# Patient Record
Sex: FEMALE | Employment: UNEMPLOYED | URBAN - METROPOLITAN AREA
[De-identification: names, ages, dates, MRNs, and addresses within clinical notes are randomized per-mention and may not be internally consistent; named-entity substitution may affect disease eponyms.]

---

## 2017-08-28 ENCOUNTER — TRANSFERRED RECORDS (OUTPATIENT)
Dept: HEALTH INFORMATION MANAGEMENT | Facility: CLINIC | Age: 27
End: 2017-08-28

## 2018-05-16 ENCOUNTER — ALLIED HEALTH/NURSE VISIT (OUTPATIENT)
Dept: NURSING | Facility: CLINIC | Age: 28
End: 2018-05-16
Payer: MEDICAID

## 2018-05-16 VITALS
RESPIRATION RATE: 16 BRPM | HEART RATE: 67 BPM | SYSTOLIC BLOOD PRESSURE: 114 MMHG | DIASTOLIC BLOOD PRESSURE: 79 MMHG | OXYGEN SATURATION: 99 % | WEIGHT: 147.9 LBS

## 2018-05-16 DIAGNOSIS — D50.9 ANEMIA, IRON DEFICIENCY: Primary | ICD-10-CM

## 2018-05-16 DIAGNOSIS — Z33.1 PREGNANCY, INCIDENTAL: ICD-10-CM

## 2018-05-16 LAB
ABO + RH BLD: NORMAL
ABO + RH BLD: NORMAL
BETA HCG QUAL IFA URINE: POSITIVE
BLD GP AB SCN SERPL QL: NORMAL
BLOOD BANK CMNT PATIENT-IMP: NORMAL
ERYTHROCYTE [DISTWIDTH] IN BLOOD BY AUTOMATED COUNT: 15.2 % (ref 10–15)
HCT VFR BLD AUTO: 35.5 % (ref 35–47)
HGB BLD-MCNC: 12.1 G/DL (ref 11.7–15.7)
MCH RBC QN AUTO: 29.9 PG (ref 26.5–33)
MCHC RBC AUTO-ENTMCNC: 34.1 G/DL (ref 31.5–36.5)
MCV RBC AUTO: 88 FL (ref 78–100)
PLATELET # BLD AUTO: 305 10E9/L (ref 150–450)
RBC # BLD AUTO: 4.05 10E12/L (ref 3.8–5.2)
SPECIMEN EXP DATE BLD: NORMAL
WBC # BLD AUTO: 6.2 10E9/L (ref 4–11)

## 2018-05-16 PROCEDURE — 86762 RUBELLA ANTIBODY: CPT | Performed by: FAMILY MEDICINE

## 2018-05-16 PROCEDURE — 87389 HIV-1 AG W/HIV-1&-2 AB AG IA: CPT | Performed by: FAMILY MEDICINE

## 2018-05-16 PROCEDURE — G0499 HEPB SCREEN HIGH RISK INDIV: HCPCS | Performed by: FAMILY MEDICINE

## 2018-05-16 PROCEDURE — 82746 ASSAY OF FOLIC ACID SERUM: CPT | Performed by: FAMILY MEDICINE

## 2018-05-16 PROCEDURE — 87591 N.GONORRHOEAE DNA AMP PROB: CPT | Performed by: FAMILY MEDICINE

## 2018-05-16 PROCEDURE — 83550 IRON BINDING TEST: CPT | Performed by: FAMILY MEDICINE

## 2018-05-16 PROCEDURE — 83540 ASSAY OF IRON: CPT | Performed by: FAMILY MEDICINE

## 2018-05-16 PROCEDURE — 36415 COLL VENOUS BLD VENIPUNCTURE: CPT | Performed by: FAMILY MEDICINE

## 2018-05-16 PROCEDURE — 99207 ZZC NO CHARGE NURSE ONLY: CPT

## 2018-05-16 PROCEDURE — 85027 COMPLETE CBC AUTOMATED: CPT | Performed by: FAMILY MEDICINE

## 2018-05-16 PROCEDURE — 86900 BLOOD TYPING SEROLOGIC ABO: CPT | Performed by: FAMILY MEDICINE

## 2018-05-16 PROCEDURE — 86901 BLOOD TYPING SEROLOGIC RH(D): CPT | Performed by: FAMILY MEDICINE

## 2018-05-16 PROCEDURE — 86850 RBC ANTIBODY SCREEN: CPT | Performed by: FAMILY MEDICINE

## 2018-05-16 PROCEDURE — 87491 CHLMYD TRACH DNA AMP PROBE: CPT | Performed by: FAMILY MEDICINE

## 2018-05-16 PROCEDURE — 87086 URINE CULTURE/COLONY COUNT: CPT | Performed by: FAMILY MEDICINE

## 2018-05-16 PROCEDURE — 84703 CHORIONIC GONADOTROPIN ASSAY: CPT | Performed by: FAMILY MEDICINE

## 2018-05-16 RX ORDER — PRENATAL VIT/IRON FUM/FOLIC AC 27MG-0.8MG
1 TABLET ORAL DAILY
Qty: 100 TABLET | Refills: 3 | COMMUNITY
Start: 2018-05-16

## 2018-05-16 NOTE — MR AVS SNAPSHOT
After Visit Summary   5/16/2018    Arlin Rodgers    MRN: [id number]           Patient Information     Date Of Birth          1990        Visit Information        Provider Department      5/16/2018 8:00 AM CR OB CLINIC [facility]        Today's Diagnoses     Anemia, iron deficiency    -  1    Pregnancy, incidental           Follow-ups after your visit        Your next 10 appointments already scheduled     May 18, 2018  2:40 PM CDT   US OB < 14 WEEKS SINGLE with [id number]   [facility] [place] ([facility])    [address] [address] [address] [phone]           Please bring a list of your medicines (including vitamins, minerals and over-the-counter drugs). Also, tell your doctor about any allergies you may have. Wear comfortable clothes and leave your valuables at home.  If you re less than 20 weeks drink four 8-ounce glasses of fluid an hour before your exam. If you need to empty your bladder before your exam, try to release only a little urine. Then, drink another glass of fluid.  You may have up to two family members in the exam room. If you bring a small child, an adult must be there to care for him or her.  Please call the Imaging Department at your exam site with any questions.            Jun 11, 2018  3:00 PM CDT   New Prenatal with Medina Quach MD   [facility] ([facility])    [id number] [place] [address] [phone]              Future tests that were ordered for you today     Open Future Orders        Priority Expected Expires Ordered    US OB < 14 Weeks Single Routine  5/16/2019 5/16/2018            Who to contact     If you have questions or need follow up information about today's clinic visit or your schedule please contact [facility] directly at [phone].  Normal or non-critical lab and imaging results will be  "communicated to you by [employer], letter or phone within 4 business days after the clinic has received the results. If you do not hear from us within 7 days, please contact the clinic through [employer] or phone. If you have a critical or abnormal lab result, we will notify you by phone as soon as possible.  Submit refill requests through [employer] or call your pharmacy and they will forward the refill request to us. Please allow 3 business days for your refill to be completed.          Additional Information About Your Visit        [employer] Information     [employer] lets you send messages to your doctor, view your test results, renew your prescriptions, schedule appointments and more. To sign up, go to [email or web address] . Click on \"Log in\" on the left side of the screen, which will take you to the Welcome page. Then click on \"Sign up Now\" on the right side of the page.     You will be asked to enter the access code listed below, as well as some personal information. Please follow the directions to create your username and password.     Your access code is: [email or web address]  Expires: 2018  9:18 AM     Your access code will  in 90 days. If you need help or a new code, please call your [place] clinic or [phone].        Care EveryWhere ID     This is your Care EveryWhere ID. This could be used by other organizations to access your [place] medical records  [id number]        Your Vitals Were     Pulse Respirations Last Period Pulse Oximetry          67 16 2018 (Approximate) 99%         Blood Pressure from Last 3 Encounters:   18 114/79    Weight from Last 3 Encounters:   18 147 lb 14.4 oz (67.1 kg)              We Performed the Following     **Iron and iron binding capacity FUTURE 2mo     ABO/Rh type and screen     Beta HCG qual IFA urine     CBC with platelets     Chlamydia trachomatis PCR     Folate     Hepatitis B surface antigen     HIV Antigen Antibody Combo     Neisseria gonorrhoeae " PCR     Rubella Antibody IgG Quantitative     Urine Culture Aerobic Bacterial        Primary Care Provider Fax #    Physician No Ref-Primary [phone]       No address on file        Equal Access to Services     HONEY VARGAS : Hadii aad ku hadmeitoma Wynn, yaritza cherylwagner, angela castro, rick ericksonadrian carvajal. So [facility] [phone].    ATENCIÓN: Si habla español, tiene a hughes disposición servicios gratuitos de asistencia lingüística. Llame al [phone].    We comply with applicable federal civil rights laws and [place] laws. We do not discriminate on the basis of race, color, national origin, age, disability, sex, sexual orientation, or gender identity.            Thank you!     Thank you for choosing [facility]  for your care. Our goal is always to provide you with excellent care. Hearing back from our patients is one way we can continue to improve our services. Please take a few minutes to complete the written survey that you may receive in the mail after your visit with us. Thank you!             Your Updated Medication List - Protect others around you: Learn how to safely use, store and throw away your medicines at www.disposemymeds.org.          This list is accurate as of 5/16/18  9:18 AM.  Always use your most recent med list.                   Brand Name Dispense Instructions for use Diagnosis    prenatal multivitamin plus iron 27-0.8 MG Tabs per tablet     100 tablet    Take 1 tablet by mouth daily    Anemia, iron deficiency, Pregnancy, incidental

## 2018-05-16 NOTE — NURSING NOTE
Subjective: 27 year old patient presents for pregnancy confirmation. Her last menstrual period was 3/19/18. She has had a positive home test.  This is her 4th pregnancy, G4, P2. She has had previous vaginal deliveries. She would like to be seen Dr. Downing for her prenatal care. She has started prenatal vitamins.      Exam:  Appointment not scheduled correctly as 611 so can not get to due date section and education section.    General Appearance: not feeling well due to nausea and pale-requested iron test as has iron deficiency anemia and needed double doses during previous pregnancies.  Discussed with Dr. Downing and also wanted Folate.  Added Folate.    Her urine pregnancy test is positive.    Assessment: positive pregnancy confirmation.    Plan: Patient has an EDC of 12/25/18. Is currently 8 weeks along. I scheduled her first OB appointment with Dr. Downing 6/11/18 3 pm. Risk assessment done. We discussed basic pregnancy care, diet, exercise and prenatal vitamins.     Pre Term Labor Risk Assessment   1. Is the patient's age <18 or >40? no  2. Does the patient have a BMI < 18.5? no  3. If previous pregnancy, was delivery within previous 6 months? no  4. Have you ever been diagnosed with pyelonephritis? no  5. Have you ever delivered a baby prior to 37 weeks gestation? no  6. Have you ever been told you have a uterine anomaly? no  7. Do you currently have uterine fibroids? no  8. Have you had any gynecological surgical procedures such as cervical conization, a LEEP procedure, laser treatment or cryosurgery of the cervix? no  9. Did your mother take NATHALIE or any other hormones when she was pregnant with you? no  10. Did conception for this pregnancy occur via In Vitro Fertilization? no  11. Are you carrying twins? no  12. Do you currently use tobacco products? no  13. Prior to this pregnancy, how much alcohol did you drink each week? (if >7/week= high risk..)  none  14. Since you learned you were pregnant, how much beer,  wine or hard liquor did you drink per week? (anything >0 = high risk) none  15. Prior to learning you are pregnant, did you use any of the following: marijuana, prescription narcotics, speed, cocaine, heroin, hallucinogens or other drugs? (any use within 1 yr = high risk)  none  16. Since you learned you are pregnant, have you used any of the following: marijuana, prescription narcotics, speed, cocaine, heroin, hallucinogens or other drugs? (any use = high risk)  none  17. Do you have a history of chemical dependency (yes=high risk)  no  18. Have you ever been treated for more than 1 Urinary Tract Infection during this pregnancy? no  19. Do you have a history of Depression, Bi-polar disorder, anxiety, schizophrenia or other mental illness?  no  20. Are you currently being treated for depression, bi-polar disorder, anxiety, schizophrenia or other mental illness? no  21. Have you had Chlamydia or gonorrhea during this pregnancy? no  22. Periodontal disease (gum disease)? no  23. Has anyone hit, slapped, kicked or otherwise hurt you? no  24. Has anyone forced you to have sex when you didn't want to? no  Summary:   Patient is not high risk for  Labor   Karon Jimenez RN

## 2018-05-17 LAB
BACTERIA SPEC CULT: NORMAL
C TRACH DNA SPEC QL NAA+PROBE: NEGATIVE
FOLATE SERPL-MCNC: 37.5 NG/ML
HBV SURFACE AG SERPL QL IA: NONREACTIVE
HIV 1+2 AB+HIV1 P24 AG SERPL QL IA: NONREACTIVE
IRON SATN MFR SERPL: 18 % (ref 15–46)
IRON SERPL-MCNC: 65 UG/DL (ref 35–180)
N GONORRHOEA DNA SPEC QL NAA+PROBE: NEGATIVE
RUBV IGG SERPL IA-ACNC: 26 IU/ML
SPECIMEN SOURCE: NORMAL
TIBC SERPL-MCNC: 370 UG/DL (ref 240–430)

## 2018-05-18 ENCOUNTER — HOSPITAL ENCOUNTER (OUTPATIENT)
Dept: ULTRASOUND IMAGING | Facility: CLINIC | Age: 28
Discharge: HOME | End: 2018-05-18
Attending: FAMILY MEDICINE | Admitting: FAMILY MEDICINE
Payer: MEDICAID

## 2018-05-18 DIAGNOSIS — Z33.1 PREGNANCY, INCIDENTAL: ICD-10-CM

## 2018-05-18 PROCEDURE — 76801 OB US < 14 WKS SINGLE FETUS: CPT

## 2018-06-11 ENCOUNTER — TELEPHONE (OUTPATIENT)
Dept: FAMILY MEDICINE | Facility: CLINIC | Age: 28
End: 2018-06-11

## 2018-07-02 ENCOUNTER — TRANSFERRED RECORDS (OUTPATIENT)
Dept: HEALTH INFORMATION MANAGEMENT | Facility: CLINIC | Age: 28
End: 2018-07-02

## 2018-07-02 LAB
C TRACH DNA SPEC QL PROBE+SIG AMP: NORMAL
N GONORRHOEA DNA SPEC QL PROBE+SIG AMP: NORMAL
SPECIMEN DESCRIP: NORMAL
SPECIMEN DESCRIPTION: NORMAL

## 2018-07-25 ENCOUNTER — PRENATAL OFFICE VISIT (OUTPATIENT)
Dept: FAMILY MEDICINE | Facility: CLINIC | Age: 28
End: 2018-07-25
Payer: COMMERCIAL

## 2018-07-25 VITALS
WEIGHT: 140 LBS | TEMPERATURE: 98 F | BODY MASS INDEX: 21.22 KG/M2 | DIASTOLIC BLOOD PRESSURE: 58 MMHG | HEIGHT: 68 IN | HEART RATE: 64 BPM | SYSTOLIC BLOOD PRESSURE: 96 MMHG | RESPIRATION RATE: 16 BRPM

## 2018-07-25 DIAGNOSIS — Z12.4 SCREENING FOR MALIGNANT NEOPLASM OF CERVIX: ICD-10-CM

## 2018-07-25 DIAGNOSIS — Z34.92 PRENATAL CARE IN SECOND TRIMESTER: Primary | ICD-10-CM

## 2018-07-25 DIAGNOSIS — R53.83 FATIGUE, UNSPECIFIED TYPE: ICD-10-CM

## 2018-07-25 LAB — HGB BLD-MCNC: 11.8 G/DL (ref 11.7–15.7)

## 2018-07-25 PROCEDURE — 99207 ZZC PRENATAL VISIT: CPT | Performed by: FAMILY MEDICINE

## 2018-07-25 PROCEDURE — 81511 FTL CGEN ABNOR FOUR ANAL: CPT | Mod: 90 | Performed by: FAMILY MEDICINE

## 2018-07-25 PROCEDURE — 00000218 ZZHCL STATISTIC OBHBG - HEMOGLOBIN: Performed by: FAMILY MEDICINE

## 2018-07-25 PROCEDURE — 36415 COLL VENOUS BLD VENIPUNCTURE: CPT | Performed by: FAMILY MEDICINE

## 2018-07-25 PROCEDURE — 84443 ASSAY THYROID STIM HORMONE: CPT | Performed by: FAMILY MEDICINE

## 2018-07-25 PROCEDURE — 99000 SPECIMEN HANDLING OFFICE-LAB: CPT | Performed by: FAMILY MEDICINE

## 2018-07-25 NOTE — PROGRESS NOTES
"Subjective:  Arlin Rodgers is a 28 year old female  who presents today for her first prenatal visit.  She has never had an abnormal PAP smear.  Her LMP was 3/23/18.  She has had trouble with nausea.  This is her 3rd pregnancy.  She is a G 3 P .    Her EDC is 18.  She has the following questions:  None.   She has been feeling the baby move for a few days.       Obstetric History       T2      L2     SAB0   TAB0   Ectopic0   Multiple0   Live Births2       # Outcome Date GA Lbr Stone/2nd Weight Sex Delivery Anes PTL Lv   3 Current            2 Term 11/27/15 39w0d  8 lb 4 oz (3.742 kg) F  EPI N BABS      Name: Pedro Salazar Term 13 39w5d  7 lb 9 oz (3.43 kg) M  EPI N BABS      Name: Major            Current Outpatient Prescriptions   Medication Sig Dispense Refill     Prenatal Vit-Fe Fumarate-FA (PRENATAL MULTIVITAMIN PLUS IRON) 27-0.8 MG TABS per tablet Take 1 tablet by mouth daily 100 tablet 3       Past Medical History:   Diagnosis Date     Blood type A+        History reviewed. No pertinent surgical history.    History reviewed. No pertinent family history.    Social History   Substance Use Topics     Smoking status: Former Smoker     Smokeless tobacco: Never Used     Alcohol use No       Objective:  Blood pressure 96/58, pulse 64, temperature 98  F (36.7  C), temperature source Oral, resp. rate 16, height 5' 8\" (1.727 m), weight 140 lb (63.5 kg), last menstrual period 2018, not currently breastfeeding.  General appearance: Healthy.  Mental Status: cooperative, normal affect, no gross thought process defects.  HEENT:   Ears- Normal external canals and TMs  Eyes- PERRL, EOM's intact, fundi benign, sharp disc margins.  Throat- Normal  Nodes- Negative  Thyroid: Normal to palpation, no enlargement or nodules noted.  Breasts: Symmetric without mass tenderness or discharge.  Axillary nodes negative.  Lungs: Clear to auscultation bilaterally  Heart.: Normal rate and rhythm.  No " murmurs, clicks or gallops.  Pulses:    R-4/4, PT-4/4, DP-4/4  Abdomen: BS active. Soft, non-tender, no masses or organomegaly.  Aorta normal. No bruits.  Genitalia: Normal female external genitalia without lesions.  Speculum:  Cervix normal,  Pap taken, bimanual exam  16 week size uterus, no adenexal mass or tenderness.  Doptone 140-150.  Extremities: Normal without edema  Reflexes:  Symmetric bilaterally and 2 + at the patella  Skin: Clear and free of rash.    Assessment:  1. Arlin Rodgers is a healthy 28 year old female here for a first prenatal visit.  2.  Complains of fatigue and has hx of anemia in previous pregnancies    Plan:  1. A Pap smear was NOT obtained  2. Prenatal labs ordered - see epicare orders  3.  fetal anatomy survey to be done around 20 weeks gestation   4. The QUAD test was discussed and offered to the patient and she accepted  5.  Will check OBHGB and TSH   6. Follow up in 4 weeks and as needed

## 2018-07-25 NOTE — MR AVS SNAPSHOT
"              After Visit Summary   7/25/2018    Arlin Rodgers    MRN: [id number]           Patient Information     Date Of Birth          1990        Visit Information        Provider Department      7/25/2018 11:00 AM Medina Quach MD [facility]        Today's Diagnoses     Prenatal care in second trimester    -  1    Screening for malignant neoplasm of cervix        Fatigue, unspecified type           Follow-ups after your visit        Follow-up notes from your care team     Return in about 4 weeks (around 8/22/2018) for prenatal care.      Future tests that were ordered for you today     Open Future Orders        Priority Expected Expires Ordered    US OB > 14 Weeks Complete Single Routine  7/25/2019 7/25/2018            Who to contact     If you have questions or need follow up information about today's clinic visit or your schedule please contact [facility] directly at [phone].  Normal or non-critical lab and imaging results will be communicated to you by MyChart, letter or phone within 4 business days after the clinic has received the results. If you do not hear from us within 7 days, please contact the clinic through MyChart or phone. If you have a critical or abnormal lab result, we will notify you by phone as soon as possible.  Submit refill requests through [employer] or call your pharmacy and they will forward the refill request to us. Please allow 3 business days for your refill to be completed.          Additional Information About Your Visit        Care EveryWhere ID     This is your Care EveryWhere ID. This could be used by other organizations to access your [place] medical records  [id number]        Your Vitals Were     Pulse Temperature Respirations Height Last Period Breastfeeding?    64 98  F (36.7  C) (Oral) 16 5' 8\" (1.727 m) 03/23/2018 No    BMI (Body Mass Index)                   21.29 kg/m2            Blood Pressure from Last 3 Encounters: "   07/25/18 96/58   05/16/18 114/79    Weight from Last 3 Encounters:   07/25/18 140 lb (63.5 kg)   05/16/18 147 lb 14.4 oz (67.1 kg)              We Performed the Following     Maternal Quad Marker 2nd Trimester     OB hemoglobin     TSH with free T4 reflex        Primary Care Provider Office Phone # Fax #    [facility] [phone] [address]  [facility] [address]        Equal Access to Services     HONEY VARGAS : Hadii aad ku hadasho Soomaali, waaxda luqadaha, qaybta kaalmada adeegyada, waxay idiin hayaan adeeg tish rosario . So [facility] [place] [phone].    ATENCIÓN: Si habla español, tiene a hughes disposición servicios gratuitos de asistencia lingüística. Llame al [phone].    We comply with applicable federal civil rights laws and [place] laws. We do not discriminate on the basis of race, color, national origin, age, disability, sex, sexual orientation, or gender identity.            Thank you!     Thank you for choosing [facility]  for your care. Our goal is always to provide you with excellent care. Hearing back from our patients is one way we can continue to improve our services. Please take a few minutes to complete the written survey that you may receive in the mail after your visit with us. Thank you!             Your Updated Medication List - Protect others around you: Learn how to safely use, store and throw away your medicines at www.disposemymeds.org.          This list is accurate as of 7/25/18 11:34 AM.  Always use your most recent med list.                   Brand Name Dispense Instructions for use Diagnosis    prenatal multivitamin plus iron 27-0.8 MG Tabs per tablet     100 tablet    Take 1 tablet by mouth daily    Anemia, iron deficiency, Pregnancy, incidental

## 2018-07-25 NOTE — LETTER
July 27, 2018      Arlin Parekhnmrobin  [address]   [facility] [address]        Dear ,    We are writing to inform you of your test results.    This quad screen in normal/Neg     Resulted Orders   Maternal Quad Marker 2nd Trimester   Result Value Ref Range    Pt Date of Birth 1990     Patient Weight 140     Weight Units LBS     Due Date 12/28/2018     Dating Method LMP     Last Mens Period 03/23/2018     Number of Fetuses VALDIVIA     Monochorionic Twins No     Race of Mother      Diabetic at Conception No     Smoking Status No     Valproic/Carbamazepine Status No     Previous Trisomy Pregnancy No     Fam History Of NTD No     In Vitro Fertilization Egg Donor Age No     Repeat Specimen No     Alpha Feto Protein 42 ng/mL    MoM for AFP 0.97     Estriol 1.58 ng/mL    MoM uE3 1.13     hCG 2nd Trimester 14245 IU/L    MoM hCG 2nd Trimester 1.21     Dimeric Inhibin A 180 pg/mL    MoM Dimeric Inhibin A 1.11     AFP Interpretation Screen Neg       Comment:      (Note)  INTERPRETATION: SCREEN NEGATIVE                               Neural Tube Defects (NTD)   Negative       Down syndrome (DS)          Negative       Trisomy 18 (T18)            Negative                                                               Pre-Test     Post-Test      Cutoff                                      Neural Tube Defects Risks   1:1030       < 1:56617    1:250           Down Syndrome Risks         1:990        1:7300       1:150           Trisomy 18 Risks            1:3860       < 1:56006    1:100                                        Comments:                                                  The risk of an open neural tube defect is less than the  screening cut-off.                                         The risk of Down syndrome is less than the screening  cut-off.                                                   The risk of trisomy 18 is less than the screening cut-off.  Test developed and  characteristics determined by [facility]. See Compliance S  tatement B: [email or web address]/CS      Maternal [age] Delivery 26.5 yr    Patient Weight 140.0 lbs.     Estimated Due Date SEE NOTE       Comment:      (Note)  Results for Estimated Due Date: 12-28-18       Gestational Age Calculated 17 wks, 5 days     Dating Other     Num of Fetuses Cisneros     Maternal Race Nonblack     Insulin Diabetic No     Smoking Status No     Fam History of NTD No     Family History of Aneuploidy No     Specimen Iteration SEE NOTE       Comment:      (Note)  Initial sample      Electronic Enhanced Report SEE NOTE       Comment:      (Note)  Access eblizz Enhanced Report using either link below:  -Direct access:   [email or web address]  -Enter Username, Password: [email or web address]  Username: [email or web address]  Password: [email or web address]  Performed by [facility],  [address] [place] [address] [phone]  [email or web address], Anirudh Anderson MD, Lab. Director     OB hemoglobin   Result Value Ref Range    Hemoglobin 11.8 11.7 - 15.7 g/dL   TSH with free T4 reflex   Result Value Ref Range    TSH 0.66 0.40 - 4.00 mU/L       If you have any questions or concerns, please call the clinic at the number listed above.       Sincerely,        Medina Quach MD/MARCELO

## 2018-07-26 LAB — TSH SERPL DL<=0.005 MIU/L-ACNC: 0.66 MU/L (ref 0.4–4)

## 2018-07-27 LAB
# FETUSES US: NORMAL
# FETUSES: NORMAL
AFP ADJ MOM AMN: 0.97
AFP SERPL-MCNC: 42 NG/ML
AGE - REPORTED: 26.5 YR
CURRENT SMOKER: NO
CURRENT SMOKER: NO
DIABETES STATUS PATIENT: NO
FAMILY MEMBER DISEASES HX: NO
FAMILY MEMBER DISEASES HX: NO
GA METHOD: NORMAL
GA METHOD: NORMAL
GA: NORMAL WK
HCG MOM SERPL: 1.21
HCG SERPL-ACNC: NORMAL IU/L
HX OF HEREDITARY DISORDERS: NO
IDDM PATIENT QL: NO
INHIBIN A MOM SERPL: 1.11
INHIBIN A SERPL-MCNC: 180 PG/ML
INTEGRATED SCN PATIENT-IMP: NORMAL
IVF PREGNANCY: NO
LMP START DATE: NORMAL
MONOCHORIONIC TWINS: NO
PATHOLOGY STUDY: NORMAL
PREV FETUS DEFECT: NO
SERVICE CMNT-IMP: NO
SPECIMEN DRAWN SERPL: NORMAL
U ESTRIOL MOM SERPL: 1.13
U ESTRIOL SERPL-MCNC: 1.58 NG/ML
VALPROIC/CARBAMAZEPINE STATUS: NO
WEIGHT UNITS: NORMAL

## 2018-08-20 ENCOUNTER — HOSPITAL ENCOUNTER (OUTPATIENT)
Dept: ULTRASOUND IMAGING | Facility: CLINIC | Age: 28
Discharge: HOME | End: 2018-08-20
Attending: FAMILY MEDICINE | Admitting: FAMILY MEDICINE
Payer: COMMERCIAL

## 2018-08-20 DIAGNOSIS — Z34.92 PRENATAL CARE IN SECOND TRIMESTER: ICD-10-CM

## 2018-08-20 PROCEDURE — 76805 OB US >/= 14 WKS SNGL FETUS: CPT

## 2018-09-12 ENCOUNTER — PRENATAL OFFICE VISIT (OUTPATIENT)
Dept: FAMILY MEDICINE | Facility: CLINIC | Age: 28
End: 2018-09-12
Payer: COMMERCIAL

## 2018-09-12 VITALS
SYSTOLIC BLOOD PRESSURE: 108 MMHG | DIASTOLIC BLOOD PRESSURE: 64 MMHG | RESPIRATION RATE: 16 BRPM | WEIGHT: 148.8 LBS | OXYGEN SATURATION: 98 % | BODY MASS INDEX: 22.55 KG/M2 | HEART RATE: 71 BPM | HEIGHT: 68 IN | TEMPERATURE: 98.1 F

## 2018-09-12 DIAGNOSIS — Z34.92 PRENATAL CARE IN SECOND TRIMESTER: Primary | ICD-10-CM

## 2018-09-12 PROCEDURE — 99207 ZZC PRENATAL VISIT: CPT | Performed by: FAMILY MEDICINE

## 2018-09-12 NOTE — MR AVS SNAPSHOT
"              After Visit Summary   9/12/2018    Arlin Rodgers    MRN: [id number]           Patient Information     Date Of Birth          1990        Visit Information        Provider Department      9/12/2018 9:00 AM Medina Quach MD [facility]         Follow-ups after your visit        Follow-up notes from your care team     Return in about 4 weeks (around 10/10/2018) for prenatal care.      Who to contact     If you have questions or need follow up information about today's clinic visit or your schedule please contact [facility] directly at [phone].  Normal or non-critical lab and imaging results will be communicated to you by MyChart, letter or phone within 4 business days after the clinic has received the results. If you do not hear from us within 7 days, please contact the clinic through MyChart or phone. If you have a critical or abnormal lab result, we will notify you by phone as soon as possible.  Submit refill requests through [employer] or call your pharmacy and they will forward the refill request to us. Please allow 3 business days for your refill to be completed.          Additional Information About Your Visit        Care EveryWhere ID     This is your Care EveryWhere ID. This could be used by other organizations to access your [place] medical records  [id number]        Your Vitals Were     Pulse Temperature Respirations Height Last Period Pulse Oximetry    71 98.1  F (36.7  C) (Oral) 16 5' 8\" (1.727 m) 03/23/2018 98%    Breastfeeding? BMI (Body Mass Index)                No 22.62 kg/m2           Blood Pressure from Last 3 Encounters:   09/12/18 108/64   07/25/18 96/58   05/16/18 114/79    Weight from Last 3 Encounters:   09/12/18 148 lb 12.8 oz (67.5 kg)   07/25/18 140 lb (63.5 kg)   05/16/18 147 lb 14.4 oz (67.1 kg)              Today, you had the following     No orders found for display       Primary Care Provider Office Phone # Fax #    " [facility] [phone]       [address]  [facility] [address]        Equal Access to Services     HONEY VARGAS : Hadii aad ku hadmeitoma [place], wadorinada luqadaha, nelidata kaakuada matthew, rick winston laRaeremington carvajal. So [facility] [phone].    ATENCIÓN: Si habla español, tiene a hughes disposición servicios gratuitos de asistencia lingüística. Llame al [phone].    We comply with applicable federal civil rights laws and [place] laws. We do not discriminate on the basis of race, color, national origin, age, disability, sex, sexual orientation, or gender identity.            Thank you!     Thank you for choosing [facility]  for your care. Our goal is always to provide you with excellent care. Hearing back from our patients is one way we can continue to improve our services. Please take a few minutes to complete the written survey that you may receive in the mail after your visit with us. Thank you!             Your Updated Medication List - Protect others around you: Learn how to safely use, store and throw away your medicines at www.disposemymeds.org.          This list is accurate as of 9/12/18  9:28 AM.  Always use your most recent med list.                   Brand Name Dispense Instructions for use Diagnosis    prenatal multivitamin plus iron 27-0.8 MG Tabs per tablet     100 tablet    Take 1 tablet by mouth daily    Anemia, iron deficiency, Pregnancy, incidental

## 2018-09-12 NOTE — PROGRESS NOTES
Concerns: GIRL BABY  Increased pelvic pressure with bending and walking  No vaginal bleeding, no contractions, no leakage of fluid  No nausea/vomiting. No heartburn  No vaginal discharge. No dysuria.   No headache, vision changes, lower extremity swelling, upper abdominal pain, chest pain, shortness of breath  Tdap planned next visit  Discussed PTL, PROM, and when to call or come in.  Normal anatomy ultrasound.  RTC 4 weeks.  GCT and HGB and Tdap at next visit - cervix checked and it was closed and thick and long  Checklist updated, see prenatal flowsheet for details    Medina Quach MD

## 2018-11-28 ENCOUNTER — TELEPHONE (OUTPATIENT)
Dept: FAMILY MEDICINE | Facility: CLINIC | Age: 28
End: 2018-11-28

## 2018-11-28 NOTE — TELEPHONE ENCOUNTER
Pt states she moved back home to [place] and is following there.  She said she called and informed our  staff.     Modesto Quiroga RN

## 2018-11-28 NOTE — TELEPHONE ENCOUNTER
This patient has only come to 2 prenatal visits and has not been in for 2 months.   She is due next month.   Did she move?   Is she getting her care somewhere else?